# Patient Record
Sex: FEMALE
[De-identification: names, ages, dates, MRNs, and addresses within clinical notes are randomized per-mention and may not be internally consistent; named-entity substitution may affect disease eponyms.]

---

## 2017-03-28 ENCOUNTER — CHARTING TRANS (OUTPATIENT)
Dept: OTHER | Age: 42
End: 2017-03-28

## 2017-03-28 ENCOUNTER — LAB SERVICES (OUTPATIENT)
Dept: OTHER | Age: 42
End: 2017-03-28

## 2017-03-29 LAB
APPEARANCE: NORMAL
BILIRUBIN: NEGATIVE
COLOR: NORMAL
GLUCOSE U: NEGATIVE
KETONES: NEGATIVE
LEUKOCYTE ESTERASE: NORMAL
LEUKOCYTES: NORMAL
NITRITE: NEGATIVE
OCCULT BLOOD: NEGATIVE
OTHER: NORMAL
PH: 6
PROTEIN: NEGATIVE
URINE SPEC GRAVITY: 1.01
UROBILINOGEN: 0.2

## 2017-03-31 LAB — BACTERIA UR CULT: NORMAL

## 2018-11-05 VITALS
WEIGHT: 160 LBS | BODY MASS INDEX: 25.71 KG/M2 | TEMPERATURE: 98.5 F | RESPIRATION RATE: 14 BRPM | OXYGEN SATURATION: 98 % | DIASTOLIC BLOOD PRESSURE: 84 MMHG | HEART RATE: 78 BPM | SYSTOLIC BLOOD PRESSURE: 122 MMHG | HEIGHT: 66 IN

## 2020-06-24 ENCOUNTER — HOSPITAL ENCOUNTER (INPATIENT)
Facility: HOSPITAL | Age: 45
LOS: 2 days | Discharge: HOME | DRG: 440 | End: 2020-06-27
Attending: EMERGENCY MEDICINE | Admitting: INTERNAL MEDICINE
Payer: COMMERCIAL

## 2020-06-24 DIAGNOSIS — K85.90 ACUTE PANCREATITIS, UNSPECIFIED COMPLICATION STATUS, UNSPECIFIED PANCREATITIS TYPE: Primary | ICD-10-CM

## 2020-06-24 LAB
SARS-COV-2 RNA RESP QL NAA+PROBE: NOT DETECTED
TRIGL SERPL-MCNC: 149 MG/DL (ref 30–149)

## 2020-06-24 PROCEDURE — 96374 THER/PROPH/DIAG INJ IV PUSH: CPT

## 2020-06-24 PROCEDURE — 96361 HYDRATE IV INFUSION ADD-ON: CPT

## 2020-06-24 PROCEDURE — 84478 ASSAY OF TRIGLYCERIDES: CPT | Performed by: EMERGENCY MEDICINE

## 2020-06-24 PROCEDURE — 99285 EMERGENCY DEPT VISIT HI MDM: CPT

## 2020-06-24 PROCEDURE — 96375 TX/PRO/DX INJ NEW DRUG ADDON: CPT

## 2020-06-24 RX ORDER — ONDANSETRON 2 MG/ML
4 INJECTION INTRAMUSCULAR; INTRAVENOUS ONCE
Status: COMPLETED | OUTPATIENT
Start: 2020-06-24 | End: 2020-06-24

## 2020-06-24 RX ORDER — HYDROMORPHONE HYDROCHLORIDE 1 MG/ML
0.5 INJECTION, SOLUTION INTRAMUSCULAR; INTRAVENOUS; SUBCUTANEOUS ONCE
Status: COMPLETED | OUTPATIENT
Start: 2020-06-24 | End: 2020-06-24

## 2020-06-24 RX ORDER — SODIUM CHLORIDE 9 MG/ML
1000 INJECTION, SOLUTION INTRAVENOUS ONCE
Status: COMPLETED | OUTPATIENT
Start: 2020-06-24 | End: 2020-06-24

## 2020-06-25 ENCOUNTER — ANESTHESIA EVENT (OUTPATIENT)
Dept: ENDOSCOPY | Facility: HOSPITAL | Age: 45
DRG: 440 | End: 2020-06-25
Payer: COMMERCIAL

## 2020-06-25 ENCOUNTER — APPOINTMENT (OUTPATIENT)
Dept: MRI IMAGING | Facility: HOSPITAL | Age: 45
DRG: 440 | End: 2020-06-25
Attending: INTERNAL MEDICINE
Payer: COMMERCIAL

## 2020-06-25 ENCOUNTER — ANESTHESIA (OUTPATIENT)
Dept: ENDOSCOPY | Facility: HOSPITAL | Age: 45
DRG: 440 | End: 2020-06-25
Payer: COMMERCIAL

## 2020-06-25 PROBLEM — K85.90 ACUTE PANCREATITIS, UNSPECIFIED COMPLICATION STATUS, UNSPECIFIED PANCREATITIS TYPE: Status: ACTIVE | Noted: 2020-06-25

## 2020-06-25 LAB
ALBUMIN SERPL-MCNC: 2.7 G/DL (ref 3.4–5)
ALBUMIN/GLOB SERPL: 0.9 {RATIO} (ref 1–2)
ALP LIVER SERPL-CCNC: 77 U/L (ref 37–98)
ALT SERPL-CCNC: 24 U/L (ref 13–56)
ANION GAP SERPL CALC-SCNC: 3 MMOL/L (ref 0–18)
AST SERPL-CCNC: 11 U/L (ref 15–37)
BASOPHILS # BLD AUTO: 0.03 X10(3) UL (ref 0–0.2)
BASOPHILS NFR BLD AUTO: 0.3 %
BILIRUB SERPL-MCNC: 0.4 MG/DL (ref 0.1–2)
BUN BLD-MCNC: 13 MG/DL (ref 7–18)
BUN/CREAT SERPL: 19.4 (ref 10–20)
CALCIUM BLD-MCNC: 7.8 MG/DL (ref 8.5–10.1)
CHLORIDE SERPL-SCNC: 112 MMOL/L (ref 98–112)
CHOLEST SMN-MCNC: 108 MG/DL (ref ?–200)
CO2 SERPL-SCNC: 27 MMOL/L (ref 21–32)
CREAT BLD-MCNC: 0.67 MG/DL (ref 0.55–1.02)
DEPRECATED RDW RBC AUTO: 43.7 FL (ref 35.1–46.3)
EOSINOPHIL # BLD AUTO: 0.15 X10(3) UL (ref 0–0.7)
EOSINOPHIL NFR BLD AUTO: 1.4 %
ERYTHROCYTE [DISTWIDTH] IN BLOOD BY AUTOMATED COUNT: 12.9 % (ref 11–15)
GLOBULIN PLAS-MCNC: 2.9 G/DL (ref 2.8–4.4)
GLUCOSE BLD-MCNC: 87 MG/DL (ref 70–99)
HAV IGM SER QL: 2.1 MG/DL (ref 1.6–2.6)
HCT VFR BLD AUTO: 36.7 % (ref 35–48)
HDLC SERPL-MCNC: 36 MG/DL (ref 40–59)
HGB BLD-MCNC: 11.8 G/DL (ref 12–16)
IMM GRANULOCYTES # BLD AUTO: 0.03 X10(3) UL (ref 0–1)
IMM GRANULOCYTES NFR BLD: 0.3 %
LDLC SERPL CALC-MCNC: 41 MG/DL (ref ?–100)
LYMPHOCYTES # BLD AUTO: 3.89 X10(3) UL (ref 1–4)
LYMPHOCYTES NFR BLD AUTO: 36.9 %
M PROTEIN MFR SERPL ELPH: 5.6 G/DL (ref 6.4–8.2)
MCH RBC QN AUTO: 29.8 PG (ref 26–34)
MCHC RBC AUTO-ENTMCNC: 32.2 G/DL (ref 31–37)
MCV RBC AUTO: 92.7 FL (ref 80–100)
MONOCYTES # BLD AUTO: 0.77 X10(3) UL (ref 0.1–1)
MONOCYTES NFR BLD AUTO: 7.3 %
NEUTROPHILS # BLD AUTO: 5.68 X10 (3) UL (ref 1.5–7.7)
NEUTROPHILS # BLD AUTO: 5.68 X10(3) UL (ref 1.5–7.7)
NEUTROPHILS NFR BLD AUTO: 53.8 %
NONHDLC SERPL-MCNC: 72 MG/DL (ref ?–130)
OSMOLALITY SERPL CALC.SUM OF ELEC: 293 MOSM/KG (ref 275–295)
PLATELET # BLD AUTO: 256 10(3)UL (ref 150–450)
POTASSIUM SERPL-SCNC: 3.9 MMOL/L (ref 3.5–5.1)
RBC # BLD AUTO: 3.96 X10(6)UL (ref 3.8–5.3)
SODIUM SERPL-SCNC: 142 MMOL/L (ref 136–145)
TRIGL SERPL-MCNC: 154 MG/DL (ref 30–149)
VLDLC SERPL CALC-MCNC: 31 MG/DL (ref 0–30)
WBC # BLD AUTO: 10.6 X10(3) UL (ref 4–11)

## 2020-06-25 PROCEDURE — 74183 MRI ABD W/O CNTR FLWD CNTR: CPT | Performed by: INTERNAL MEDICINE

## 2020-06-25 PROCEDURE — 76376 3D RENDER W/INTRP POSTPROCES: CPT | Performed by: INTERNAL MEDICINE

## 2020-06-25 PROCEDURE — 80061 LIPID PANEL: CPT | Performed by: INTERNAL MEDICINE

## 2020-06-25 PROCEDURE — 85025 COMPLETE CBC W/AUTO DIFF WBC: CPT | Performed by: INTERNAL MEDICINE

## 2020-06-25 PROCEDURE — 88305 TISSUE EXAM BY PATHOLOGIST: CPT | Performed by: INTERNAL MEDICINE

## 2020-06-25 PROCEDURE — A9575 INJ GADOTERATE MEGLUMI 0.1ML: HCPCS | Performed by: HOSPITALIST

## 2020-06-25 PROCEDURE — 80053 COMPREHEN METABOLIC PANEL: CPT | Performed by: INTERNAL MEDICINE

## 2020-06-25 PROCEDURE — 83735 ASSAY OF MAGNESIUM: CPT | Performed by: INTERNAL MEDICINE

## 2020-06-25 PROCEDURE — 0DB38ZX EXCISION OF LOWER ESOPHAGUS, VIA NATURAL OR ARTIFICIAL OPENING ENDOSCOPIC, DIAGNOSTIC: ICD-10-PCS | Performed by: INTERNAL MEDICINE

## 2020-06-25 RX ORDER — ONDANSETRON 2 MG/ML
4 INJECTION INTRAMUSCULAR; INTRAVENOUS EVERY 4 HOURS PRN
Status: DISCONTINUED | OUTPATIENT
Start: 2020-06-25 | End: 2020-06-27

## 2020-06-25 RX ORDER — LIDOCAINE HYDROCHLORIDE 10 MG/ML
INJECTION, SOLUTION EPIDURAL; INFILTRATION; INTRACAUDAL; PERINEURAL AS NEEDED
Status: DISCONTINUED | OUTPATIENT
Start: 2020-06-25 | End: 2020-06-25 | Stop reason: SURG

## 2020-06-25 RX ORDER — SODIUM CHLORIDE, SODIUM LACTATE, POTASSIUM CHLORIDE, CALCIUM CHLORIDE 600; 310; 30; 20 MG/100ML; MG/100ML; MG/100ML; MG/100ML
INJECTION, SOLUTION INTRAVENOUS CONTINUOUS
Status: DISCONTINUED | OUTPATIENT
Start: 2020-06-25 | End: 2020-06-27

## 2020-06-25 RX ORDER — HYDROMORPHONE HYDROCHLORIDE 1 MG/ML
0.5 INJECTION, SOLUTION INTRAMUSCULAR; INTRAVENOUS; SUBCUTANEOUS EVERY 30 MIN PRN
Status: ACTIVE | OUTPATIENT
Start: 2020-06-25 | End: 2020-06-25

## 2020-06-25 RX ORDER — ENOXAPARIN SODIUM 100 MG/ML
40 INJECTION SUBCUTANEOUS DAILY
Status: DISCONTINUED | OUTPATIENT
Start: 2020-06-25 | End: 2020-06-27

## 2020-06-25 RX ORDER — ONDANSETRON 2 MG/ML
4 INJECTION INTRAMUSCULAR; INTRAVENOUS EVERY 6 HOURS PRN
Status: DISCONTINUED | OUTPATIENT
Start: 2020-06-25 | End: 2020-06-27

## 2020-06-25 RX ORDER — SODIUM CHLORIDE 9 MG/ML
INJECTION, SOLUTION INTRAVENOUS CONTINUOUS
Status: ACTIVE | OUTPATIENT
Start: 2020-06-25 | End: 2020-06-25

## 2020-06-25 RX ORDER — NALOXONE HYDROCHLORIDE 0.4 MG/ML
80 INJECTION, SOLUTION INTRAMUSCULAR; INTRAVENOUS; SUBCUTANEOUS AS NEEDED
Status: ACTIVE | OUTPATIENT
Start: 2020-06-25 | End: 2020-06-26

## 2020-06-25 RX ORDER — ACETAMINOPHEN 325 MG/1
650 TABLET ORAL EVERY 6 HOURS PRN
Status: DISCONTINUED | OUTPATIENT
Start: 2020-06-25 | End: 2020-06-27

## 2020-06-25 RX ORDER — MORPHINE SULFATE 4 MG/ML
1 INJECTION, SOLUTION INTRAMUSCULAR; INTRAVENOUS EVERY 2 HOUR PRN
Status: DISCONTINUED | OUTPATIENT
Start: 2020-06-25 | End: 2020-06-27

## 2020-06-25 RX ORDER — MORPHINE SULFATE 4 MG/ML
2 INJECTION, SOLUTION INTRAMUSCULAR; INTRAVENOUS EVERY 2 HOUR PRN
Status: DISCONTINUED | OUTPATIENT
Start: 2020-06-25 | End: 2020-06-27

## 2020-06-25 RX ORDER — METOCLOPRAMIDE HYDROCHLORIDE 5 MG/ML
10 INJECTION INTRAMUSCULAR; INTRAVENOUS EVERY 8 HOURS PRN
Status: DISCONTINUED | OUTPATIENT
Start: 2020-06-25 | End: 2020-06-27

## 2020-06-25 RX ORDER — MORPHINE SULFATE 4 MG/ML
4 INJECTION, SOLUTION INTRAMUSCULAR; INTRAVENOUS EVERY 2 HOUR PRN
Status: DISCONTINUED | OUTPATIENT
Start: 2020-06-25 | End: 2020-06-27

## 2020-06-25 RX ADMIN — LIDOCAINE HYDROCHLORIDE 100 MG: 10 INJECTION, SOLUTION EPIDURAL; INFILTRATION; INTRACAUDAL; PERINEURAL at 15:50:00

## 2020-06-25 NOTE — ED PROVIDER NOTES
Patient Seen in: [facility] Emergency Department      History   Patient presents with:  Abdomen/Flank Pain    Stated Complaint: FROM IC, PANCREATITIS     HPI    53-year-old female presents to the emergency department for complaints of abdominal pain nontender. Extremities: No evidence of deformity. No clubbing or cyanosis. Neuro: No focal deficit is noted. ED Course     Labs Reviewed   TRIGLYCERIDES   RAPID SARS-COV-2 BY PCR          Patient was brought back to the examination room immediately.

## 2020-06-25 NOTE — PROGRESS NOTES
Received pt from endoscopy s/p EGD with biopsies. Alert, oriented. Calm. Denies pain, n/v. NSR. Saturating well on RA.  Pt kept NPO for MRI

## 2020-06-25 NOTE — H&P
[facility] Hospitalist H&P       CC: b/l flank pain    PCP: Kamron Evans MD    History of Present Illness: Pt is a 46 yo with no significant PMH who is admitted for b/l flank pain and noted to have pancreatitis on imaging.  Yesterday at work, b/l severe co Extremities: Extremities normal, atraumatic, no cyanosis or edema.    Skin: Skin color, texture, turgor normal. No visible rashes    Neurologic:  Psychiatric: No facial droop or dysarthria  appropriate affect,  memory intact     Diagnostic Data:    CBC/Ch

## 2020-06-25 NOTE — ED NOTES
Patient is c/o increased pain MD notified and new orders received.
Patient is resting quietly on ER cart and appears comfortable. Awaiting admit.
Patient states pain is resolved and appears comfortable.
Report called to [facility]
7

## 2020-06-25 NOTE — OPERATIVE REPORT
ENDOSCOPY OPERATIVE REPORT    Patient Name:  Kevin Freeman Record #: [id number]  YOB: 1975  Date of Procedure: 6/25/2020    Preoperative Diagnosis:  Pancreatitis, abdominal pain    Postoperative Diagnosis:  There was a small schatzki and throughtout the vital signs were stable. Specimens:  See above      Condition on discharge from procedure:  good      PLAN:    I will communicate results of the pathology with the patient. Pain has improved since this morning.  Continue IVF and

## 2020-06-25 NOTE — CONSULTS
[facility]  Report of Consultation    Kelley Figueredo Patient Status:  Inpatient    1975 MRN [id number]   [facility] 8NE-A Attending Loraine Tamez, *   Hosp Day # 0 PCP Leslee Sen MD     Reason for Consultation:  Tino Nicholson the procedure are available and option of no further investigation/procedure and the risks and benefits of these alternatives were discussed with the patient. The patient demonstrated understanding.     Dr Jasmina Hall and I discussed her care today as well changes  RESPIRATORY: denies new/changing shortness of breath, wheezing or cough   CARDIOVASCULAR: denies chest pain or WHITE; no palpitations   GI: as above  GENITAL//GYN: no dysuria, urgency or frequency;  no nocturia  MUSCULOSKELETAL: no new joint compl 5.10 mmol/L 3.70   Chloride      101 - 111 mmol/L 103   Carbon Dioxide, Total      22.0 - 32.0 mmol/L 26.6   CALCIUM      8.3 - 10.3 mg/dL 9.3   TOTAL PROTEIN      6.1 - 8.3 g/dL 7.2   Albumin      3.5 - 4.8 g/dL 3.8   Total Bilirubin      0.10 - 2.00 mg/d lymphadenopathy. GASTROINTESTINAL TRACT:  Normal appendix. PERITONEUM:Normal.  PELVIC ORGANS:  IUD in place. BLADDER:Normal.  ABDOMINAL WALL:  Fat-containing umbilical hernia.    MUSCULOSKELETAL:  Normal.  =====  IMPRESSION:   Peripancreatic edema juan

## 2020-06-25 NOTE — PLAN OF CARE
NURSING ADMISSION NOTE      Patient admitted via Cart  Oriented to room. Safety precautions initiated. Bed in low position. Call light in reach. Pt admitted to CTU 8. A&O x4. Tele rhythm NSR. O2 sat WNL on room air.  Pt denies c/o chest pain or sh toileting routine/schedule  - Consider collaborating with pharmacy to review patient's medication profile  Outcome: Progressing  Goal: Maintains adequate nutritional intake (undernourished)  Description  INTERVENTIONS:  - Monitor percentage of each meal co

## 2020-06-25 NOTE — ANESTHESIA PREPROCEDURE EVALUATION
PRE-OP EVALUATION    Patient Name: Alanis Tan    Pre-op Diagnosis: ABDOMINAL PAIN    Procedure(s):  ESOPHAGOGASTRODUODENOSCOPY with biopsies     Surgeon(s) and Role:     * Austin Casas MD - Primary    Pre-op vitals reviewed.   Temp: 99.2 °F (37.3 ° Endo/Other                                  Pulmonary                           Neuro/Psych                                    History reviewed. No pertinent surgical history.   Social History    Tobacco Use      Smoking status: Never Smoker      Smokeless

## 2020-06-25 NOTE — ANESTHESIA POSTPROCEDURE EVALUATION
[address] [facility] Patient Status:  Inpatient   Age/Gender 45year old female MRN [id number]   Location [address] [facility]. Attending [address] Day # 0 PCP Eloina Naqvi MD       Anesthesia Post-op Note    Procedur

## 2020-06-26 LAB — HGB BLD-MCNC: 12.2 G/DL (ref 12–16)

## 2020-06-26 PROCEDURE — 85018 HEMOGLOBIN: CPT | Performed by: HOSPITALIST

## 2020-06-26 RX ORDER — HYDROCODONE BITARTRATE AND ACETAMINOPHEN 5; 325 MG/1; MG/1
1 TABLET ORAL EVERY 6 HOURS PRN
Status: DISCONTINUED | OUTPATIENT
Start: 2020-06-26 | End: 2020-06-27

## 2020-06-26 NOTE — PLAN OF CARE
Assumed care for pt at 0700  Aox4, calm, cooperative, independent, up ad maki. NSR on cardiac monitor. Adequate saturation on RA. Lung sounds clear. Denies SOB, chest discomfort. Had some nausea this morning-zofran given and seems to be better. Denies pain. adequate nutritional intake (undernourished)  Description  INTERVENTIONS:  - Monitor percentage of each meal consumed  - Identify factors contributing to decreased intake, treat as appropriate  - Assist with meals as needed  - Monitor I&O, WT and lab value

## 2020-06-26 NOTE — PLAN OF CARE
Alert and oriented  SR on tele  Up ad maki  IVF infusing  C/o abdominal pain -medicated as ordered  MRI of the abdomen completed  Pt able to tolerate the chicken broth  Plan of care discussed.       Problem: Patient/Family Goals  Goal: Patient/Family Long Te values  - Obtain nutritional consult as needed  - Optimize oral hygiene and moisture  - Encourage food from home; allow for food preferences  - Enhance eating environment  Outcome: Progressing  Goal: Achieves appropriate nutritional intake (bariatric)  Huey

## 2020-06-26 NOTE — PROGRESS NOTES
[facility] Hospitalist Progress Note     PCP: Mandy Pablo MD    CC:  Follow up    SUBJECTIVE:  Sitting up in bed, tolerated CLD. Pain nearly resolved. No n/v.  Hopes to go home today    OBJECTIVE:  Temp:  [98.9 °F (37.2 °C)-99.4 °F (37.4 °C)] 98.9 °F (37.2 ° [facility] who is admitted for b/l flank pain and noted to have pancreatitis on imaging     **acute pancreatitis-improving  -CT a/p suggesting acute interstitial pancreatitis  -lipase significantly elevated.  UA neg, UC pending, TG not markedly elevated  - IVF, IV

## 2020-06-26 NOTE — PROGRESS NOTES
[facility]  Progress Note    Enio Wolf Patient Status:  Inpatient    1975 MRN [id number]   [facility] 8NE-A Attending Erika Dodd, *   Hosp Day # 1 PCP Mare Vyas MD     Subjective:  Enio Wolf is a(n) 44 year 12.9  --    NEPRELIM  --   --  5.68  --    WBC 11.99  --  10.6  --      --  256.0  --     < > = values in this interval not displayed. PROCEDURE:  MRI ABDOMEN&MRCP W/3D (W+W0)(CPT=74183/09991)     COMPARISON:  None.      INDICATIONS:  pa choledocholithiasis. 2. No suspicious hepatic lesions. 3. Patchy left lower lobe consolidation. Minimal right lower lobe consolidation. 4. No suspicious pancreatic lesions. Minimal fluid adjacent to the pancreatic tail is noted.   This may be

## 2020-06-26 NOTE — PAYOR COMM NOTE
--------------  ADMISSION REVIEW     Payor: [address] [facility]  Subscriber #:  [id number]  Authorization Number: [id number]      Patient Seen in: [facility] Emergency Department    History   Patient presents with:  Abdomen/Flank Pain    Stated Co 10:19 PM    Disposition and Plan     Clinical Impression:  Acute pancreatitis, unspecified complication status, unspecified pancreatitis type  (primary encounter diagnosis)    Disposition:  Admit  6/24/2020 10:19 pm               [facility] Hospitalist H&P 14.0 13   CREATSERUM 0.85 0.67   GLU 95 87   CA  --  7.8*   MG  --  2.1     Lab 06/24/20 1959 06/25/20  0449   ALT 27 24   AST 19 11*   ALB 3.8 2.7*      Radiology: Ct Abdomen+pelvis(contrast Only)(cpt=74177)    Result Date: 6/24/2020  IMPRESSION: Milagros Guerra unspecified complication status, unspecified pancreatitis type     44 yo with no significant PMH who is admitted for b/l flank pain and noted to have pancreatitis on imaging     **acute pancreatitis-improving  -CT a/p suggesting acute interstitial pancreat

## 2020-06-27 VITALS
DIASTOLIC BLOOD PRESSURE: 69 MMHG | OXYGEN SATURATION: 98 % | WEIGHT: 173.06 LBS | RESPIRATION RATE: 18 BRPM | HEART RATE: 70 BPM | BODY MASS INDEX: 28 KG/M2 | TEMPERATURE: 98 F | SYSTOLIC BLOOD PRESSURE: 104 MMHG

## 2020-06-27 LAB
ALBUMIN SERPL-MCNC: 2.8 G/DL (ref 3.4–5)
ALBUMIN/GLOB SERPL: 0.8 {RATIO} (ref 1–2)
ALP LIVER SERPL-CCNC: 76 U/L (ref 37–98)
ALT SERPL-CCNC: 21 U/L (ref 13–56)
ANION GAP SERPL CALC-SCNC: 5 MMOL/L (ref 0–18)
AST SERPL-CCNC: 11 U/L (ref 15–37)
BASOPHILS # BLD AUTO: 0.03 X10(3) UL (ref 0–0.2)
BASOPHILS NFR BLD AUTO: 0.4 %
BILIRUB SERPL-MCNC: 0.2 MG/DL (ref 0.1–2)
BILIRUB UR QL STRIP.AUTO: NEGATIVE
BUN BLD-MCNC: 6 MG/DL (ref 7–18)
BUN/CREAT SERPL: 8.5 (ref 10–20)
CALCIUM BLD-MCNC: 8.4 MG/DL (ref 8.5–10.1)
CHLORIDE SERPL-SCNC: 108 MMOL/L (ref 98–112)
CLARITY UR REFRACT.AUTO: CLEAR
CO2 SERPL-SCNC: 27 MMOL/L (ref 21–32)
CREAT BLD-MCNC: 0.71 MG/DL (ref 0.55–1.02)
DEPRECATED RDW RBC AUTO: 43.3 FL (ref 35.1–46.3)
EOSINOPHIL # BLD AUTO: 0.36 X10(3) UL (ref 0–0.7)
EOSINOPHIL NFR BLD AUTO: 4.6 %
ERYTHROCYTE [DISTWIDTH] IN BLOOD BY AUTOMATED COUNT: 12.9 % (ref 11–15)
GLOBULIN PLAS-MCNC: 3.6 G/DL (ref 2.8–4.4)
GLUCOSE BLD-MCNC: 102 MG/DL (ref 70–99)
GLUCOSE UR STRIP.AUTO-MCNC: NEGATIVE MG/DL
HCT VFR BLD AUTO: 38.5 % (ref 35–48)
HGB BLD-MCNC: 12.4 G/DL (ref 12–16)
IMM GRANULOCYTES # BLD AUTO: 0.02 X10(3) UL (ref 0–1)
IMM GRANULOCYTES NFR BLD: 0.3 %
KETONES UR STRIP.AUTO-MCNC: NEGATIVE MG/DL
LEUKOCYTE ESTERASE UR QL STRIP.AUTO: NEGATIVE
LIPASE SERPL-CCNC: 545 U/L (ref 73–393)
LYMPHOCYTES # BLD AUTO: 2.73 X10(3) UL (ref 1–4)
LYMPHOCYTES NFR BLD AUTO: 34.6 %
M PROTEIN MFR SERPL ELPH: 6.4 G/DL (ref 6.4–8.2)
MCH RBC QN AUTO: 29.7 PG (ref 26–34)
MCHC RBC AUTO-ENTMCNC: 32.2 G/DL (ref 31–37)
MCV RBC AUTO: 92.1 FL (ref 80–100)
MONOCYTES # BLD AUTO: 0.52 X10(3) UL (ref 0.1–1)
MONOCYTES NFR BLD AUTO: 6.6 %
NEUTROPHILS # BLD AUTO: 4.22 X10 (3) UL (ref 1.5–7.7)
NEUTROPHILS # BLD AUTO: 4.22 X10(3) UL (ref 1.5–7.7)
NEUTROPHILS NFR BLD AUTO: 53.5 %
NITRITE UR QL STRIP.AUTO: NEGATIVE
OSMOLALITY SERPL CALC.SUM OF ELEC: 288 MOSM/KG (ref 275–295)
PH UR STRIP.AUTO: 7 [PH] (ref 4.5–8)
PLATELET # BLD AUTO: 282 10(3)UL (ref 150–450)
POTASSIUM SERPL-SCNC: 3.7 MMOL/L (ref 3.5–5.1)
PROCALCITONIN SERPL-MCNC: <0.05 NG/ML (ref ?–0.16)
PROT UR STRIP.AUTO-MCNC: NEGATIVE MG/DL
RBC # BLD AUTO: 4.18 X10(6)UL (ref 3.8–5.3)
SODIUM SERPL-SCNC: 140 MMOL/L (ref 136–145)
SP GR UR STRIP.AUTO: <1.005 (ref 1–1.03)
UROBILINOGEN UR STRIP.AUTO-MCNC: <2 MG/DL
WBC # BLD AUTO: 7.9 X10(3) UL (ref 4–11)

## 2020-06-27 PROCEDURE — 81001 URINALYSIS AUTO W/SCOPE: CPT | Performed by: HOSPITALIST

## 2020-06-27 PROCEDURE — 80053 COMPREHEN METABOLIC PANEL: CPT | Performed by: INTERNAL MEDICINE

## 2020-06-27 PROCEDURE — S0028 INJECTION, FAMOTIDINE, 20 MG: HCPCS | Performed by: INTERNAL MEDICINE

## 2020-06-27 PROCEDURE — 85025 COMPLETE CBC W/AUTO DIFF WBC: CPT | Performed by: INTERNAL MEDICINE

## 2020-06-27 PROCEDURE — 83690 ASSAY OF LIPASE: CPT | Performed by: HOSPITALIST

## 2020-06-27 PROCEDURE — 84145 PROCALCITONIN (PCT): CPT | Performed by: HOSPITALIST

## 2020-06-27 RX ORDER — POTASSIUM CHLORIDE 20 MEQ/1
40 TABLET, EXTENDED RELEASE ORAL ONCE
Status: COMPLETED | OUTPATIENT
Start: 2020-06-27 | End: 2020-06-27

## 2020-06-27 RX ORDER — FAMOTIDINE 10 MG/ML
20 INJECTION, SOLUTION INTRAVENOUS DAILY
Status: DISCONTINUED | OUTPATIENT
Start: 2020-06-27 | End: 2020-06-27

## 2020-06-27 RX ORDER — FAMOTIDINE 20 MG/1
20 TABLET ORAL DAILY
Qty: 30 TABLET | Refills: 0 | Status: SHIPPED | OUTPATIENT
Start: 2020-06-27

## 2020-06-27 NOTE — PLAN OF CARE
Assumed care of patient @ 0730, A/XO4, here for abdominal pain, MRI showing acute pancreatitis.  Pt was complaining of 5/10 pain @ 0700 however tylenol w/ norco following has improved pt's pain level - 1/10 tolerable, encouraged to sit up and ambulate if ab

## 2020-06-27 NOTE — PROGRESS NOTES
[facility]  Progress Note    Terry Stage Patient Status:  Inpatient    1975 MRN [id number]   [facility] 8NE-A Attending Du Lofton, *   Hosp Day # 2 PCP Brunilda Sepulveda MD     Subjective:  Terry Stage is a(n) 44 year and cooperative  Lungs: clear to auscultation bilaterally  Heart: reg rate  Abdomen: soft. Non tender/distended. No evangelina/guarding/rigidity.   No pain w/ deep palpation  Extremities: extremities normal, atraumatic, no cyanosis or edema  Neurologic: Grossly no KIDNEYS:  No mass or obstruction. ADRENALS:  No mass or enlargement. AORTA/VASCULAR:  No aneurysm or dissection. RETROPERITONEUM:  No mass or adenopathy. BOWEL/MESENTERY:  No visible mass, obstruction, or bowel wall thickening.     ABDOMINAL W their questions and concerns were addressed and were agreeable to the plan as listed      Kelly Jean

## 2020-06-27 NOTE — PLAN OF CARE
Assumed care of pt at 1930 a/o x4 in no apparent distress tolerating a full liquid dinner. She stated some loose BM's during the day and has not had food substance in days. She is getting IVF of LR and IV site c/d/I without redness or swelling noted.   Sh

## 2020-06-27 NOTE — PROGRESS NOTES
Here are the biopsy/pathology findings from your recent EGD (uppper endoscopy):     The biopsy/pathology findings from your upper endoscopy showed:    --biopsies from the lower esophagus did not have cancer noted, it did show the inflammatory cells called \

## 2020-06-27 NOTE — PROGRESS NOTES
[facility] hospitalist daily note  Patient was seen/examined on 6/27/20    S; no chest pain, no SOB, does have abdominal pain after eating  + passing gas and urinating    Medications in Epic    PE    06/27/20  0810   BP: 133/88   Pulse: 77   Resp: 18   Temp: 98. 3

## 2020-06-29 NOTE — PAYOR COMM NOTE
[facility]  Subscriber #:  [id number]  Authorization Number: [id number]      Admit date: 6/25/20  Admit time:  0000  Discharge Date: 6/27/2020

## 2020-07-02 NOTE — DISCHARGE SUMMARY
[facility] Internal Medicine Discharge Summary   Patient ID:  Sangita Colunga  [id number]  40 year old  7/31/1975    Admit date: 6/24/2020    Discharge date and time: 6/27/20/2020     Attending Physician: No att. providers found     Primary Care Physician: Maria Del Rosario Cox including morphine IV ordered   Check lipase, check UA   Recent Urine pregnancy negative     Consolidation on MRCP  Check procalcitonin  No URI symptoms, pt is afebrile, no leukocytosis  Recent Rapid covid negative  Instructed pt to follow up with PCP, pt PM     Finalized by: Danica Ho MD on 6/25/2020 at 11:27 PM             Operative Procedures: Procedure(s) (LRB):  ESOPHAGOGASTRODUODENOSCOPY (EGD) (N/A)       Please seek medical attention if your symptoms do no improve or get worse    Diet as per

## 2020-07-24 ENCOUNTER — LAB ENCOUNTER (OUTPATIENT)
Dept: LAB | Facility: HOSPITAL | Age: 45
End: 2020-07-24
Attending: INTERNAL MEDICINE
Payer: COMMERCIAL

## 2020-07-24 DIAGNOSIS — K85.90 ACUTE PANCREATITIS, UNSPECIFIED COMPLICATION STATUS, UNSPECIFIED PANCREATITIS TYPE: ICD-10-CM

## 2020-07-25 LAB — SARS-COV-2 RNA RESP QL NAA+PROBE: NOT DETECTED

## 2020-07-27 ENCOUNTER — HOSPITAL ENCOUNTER (OUTPATIENT)
Facility: HOSPITAL | Age: 45
Setting detail: HOSPITAL OUTPATIENT SURGERY
Discharge: HOME | End: 2020-07-27
Attending: INTERNAL MEDICINE | Admitting: INTERNAL MEDICINE
Payer: COMMERCIAL

## 2020-07-27 ENCOUNTER — ANESTHESIA EVENT (OUTPATIENT)
Dept: ENDOSCOPY | Facility: HOSPITAL | Age: 45
End: 2020-07-27
Payer: COMMERCIAL

## 2020-07-27 ENCOUNTER — ANESTHESIA (OUTPATIENT)
Dept: ENDOSCOPY | Facility: HOSPITAL | Age: 45
End: 2020-07-27
Payer: COMMERCIAL

## 2020-07-27 VITALS
OXYGEN SATURATION: 100 % | BODY MASS INDEX: 25.71 KG/M2 | TEMPERATURE: 99 F | RESPIRATION RATE: 20 BRPM | SYSTOLIC BLOOD PRESSURE: 112 MMHG | HEIGHT: 66 IN | HEART RATE: 69 BPM | WEIGHT: 160 LBS | DIASTOLIC BLOOD PRESSURE: 65 MMHG

## 2020-07-27 DIAGNOSIS — K85.90 ACUTE PANCREATITIS, UNSPECIFIED COMPLICATION STATUS, UNSPECIFIED PANCREATITIS TYPE: Primary | ICD-10-CM

## 2020-07-27 DIAGNOSIS — Z87.19 HISTORY OF PANCREATITIS: ICD-10-CM

## 2020-07-27 LAB
POCT URINE PREGNANCY: NEGATIVE
PROCEDURE CONTROL: PRESENT

## 2020-07-27 PROCEDURE — 81025 URINE PREGNANCY TEST: CPT | Performed by: INTERNAL MEDICINE

## 2020-07-27 PROCEDURE — 0DJ08ZZ INSPECTION OF UPPER INTESTINAL TRACT, VIA NATURAL OR ARTIFICIAL OPENING ENDOSCOPIC: ICD-10-PCS | Performed by: INTERNAL MEDICINE

## 2020-07-27 RX ORDER — HYDROMORPHONE HYDROCHLORIDE 1 MG/ML
0.4 INJECTION, SOLUTION INTRAMUSCULAR; INTRAVENOUS; SUBCUTANEOUS EVERY 5 MIN PRN
Status: DISCONTINUED | OUTPATIENT
Start: 2020-07-27 | End: 2020-07-27

## 2020-07-27 RX ORDER — SODIUM CHLORIDE, SODIUM LACTATE, POTASSIUM CHLORIDE, CALCIUM CHLORIDE 600; 310; 30; 20 MG/100ML; MG/100ML; MG/100ML; MG/100ML
INJECTION, SOLUTION INTRAVENOUS CONTINUOUS
Status: DISCONTINUED | OUTPATIENT
Start: 2020-07-27 | End: 2020-07-27

## 2020-07-27 RX ORDER — NALOXONE HYDROCHLORIDE 0.4 MG/ML
80 INJECTION, SOLUTION INTRAMUSCULAR; INTRAVENOUS; SUBCUTANEOUS AS NEEDED
Status: DISCONTINUED | OUTPATIENT
Start: 2020-07-27 | End: 2020-07-27

## 2020-07-27 RX ORDER — METOCLOPRAMIDE HYDROCHLORIDE 5 MG/ML
10 INJECTION INTRAMUSCULAR; INTRAVENOUS AS NEEDED
Status: DISCONTINUED | OUTPATIENT
Start: 2020-07-27 | End: 2020-07-27

## 2020-07-27 RX ORDER — LIDOCAINE HYDROCHLORIDE 10 MG/ML
INJECTION, SOLUTION EPIDURAL; INFILTRATION; INTRACAUDAL; PERINEURAL AS NEEDED
Status: DISCONTINUED | OUTPATIENT
Start: 2020-07-27 | End: 2020-07-27 | Stop reason: SURG

## 2020-07-27 RX ORDER — HYDROCODONE BITARTRATE AND ACETAMINOPHEN 10; 325 MG/1; MG/1
1 TABLET ORAL AS NEEDED
Status: DISCONTINUED | OUTPATIENT
Start: 2020-07-27 | End: 2020-07-27

## 2020-07-27 RX ORDER — HYDROCODONE BITARTRATE AND ACETAMINOPHEN 10; 325 MG/1; MG/1
2 TABLET ORAL AS NEEDED
Status: DISCONTINUED | OUTPATIENT
Start: 2020-07-27 | End: 2020-07-27

## 2020-07-27 RX ORDER — ONDANSETRON 2 MG/ML
4 INJECTION INTRAMUSCULAR; INTRAVENOUS AS NEEDED
Status: DISCONTINUED | OUTPATIENT
Start: 2020-07-27 | End: 2020-07-27

## 2020-07-27 RX ADMIN — SODIUM CHLORIDE, SODIUM LACTATE, POTASSIUM CHLORIDE, CALCIUM CHLORIDE: 600; 310; 30; 20 INJECTION, SOLUTION INTRAVENOUS at 15:51:00

## 2020-07-27 RX ADMIN — LIDOCAINE HYDROCHLORIDE 25 MG: 10 INJECTION, SOLUTION EPIDURAL; INFILTRATION; INTRACAUDAL; PERINEURAL at 15:32:00

## 2020-07-27 RX ADMIN — SODIUM CHLORIDE, SODIUM LACTATE, POTASSIUM CHLORIDE, CALCIUM CHLORIDE: 600; 310; 30; 20 INJECTION, SOLUTION INTRAVENOUS at 15:32:00

## 2020-07-27 NOTE — OPERATIVE REPORT
OPERATIVE REPORT   PATIENT NAME: Haylee Whitehead  MRN: [id number]  DATE OF OPERATION: 7/27/2020  PREOPERATIVE DIAGNOSIS:   1. Acute pancreatitis, of unclear etiology. POSTOPERATIVE DIAGNOSES:  1.   Tiny gallbladder polyp  PROCEDURE PERFORMED: Upper endoscopic

## 2020-07-27 NOTE — ANESTHESIA PREPROCEDURE EVALUATION
PRE-OP EVALUATION    Patient Name: Yessica Carrasco    Pre-op Diagnosis: History of pancreatitis [Z87.19]    Procedure(s):  UPPER ENDOSCOPIC ULTRASOUND    Surgeon(s) and Role:     Eduin Baeza MD - Primary    Pre-op vitals reviewed.         Body mass ind 06/27/2020     06/24/2020    K 3.7 06/27/2020    K 3.70 06/24/2020     06/27/2020     06/24/2020    CO2 27.0 06/27/2020    CO2 26.6 06/24/2020    BUN 6 (L) 06/27/2020    BUN 14.0 06/24/2020    CREATSERUM 0.71 06/27/2020    CREATSERUM 0.85

## 2020-07-27 NOTE — ANESTHESIA POSTPROCEDURE EVALUATION
[address] Patient Status:  Hospital Outpatient Surgery   Age/Gender 45year old female MRN [id number]   Location [address] [facility]. Attending Marina Marshall MD   Hosp Day # 0 PCP Brunilda Sepulveda MD       Anesthesia Post-op Note

## (undated) DEVICE — 1200CC GUARDIAN II: Brand: GUARDIAN

## (undated) DEVICE — 3M™ RED DOT™ MONITORING ELECTRODE WITH FOAM TAPE AND STICKY GEL, 50/BAG, 20/CASE, 72/PLT 2570: Brand: RED DOT™

## (undated) DEVICE — ENDOSCOPY PACK UPPER: Brand: MEDLINE INDUSTRIES, INC.

## (undated) DEVICE — ENDOSCOPY PACK - LOWER: Brand: MEDLINE INDUSTRIES, INC.

## (undated) DEVICE — FORCEP BIOPSY RJ4 LG CAP W/ND

## (undated) DEVICE — Device: Brand: DEFENDO AIR/WATER/SUCTION AND BIOPSY VALVE

## (undated) DEVICE — FILTERLINE NASAL ADULT O2/CO2

## (undated) NOTE — LETTER
6/29/2020          [address] [place] [id number]    Dear Jennifer Shen,     Here are the biopsy/pathology findings from your recent EGD (uppper endoscopy):     The biopsy/pathology findings from your upper endoscopy showed:    --biopsies from t

## (undated) NOTE — LETTER
Pari Stevens [address]  [address] [facility] [address], [address] [facility]  Authorization for Surgical Operation and Procedure     Date:__6/25/2020_________                                                                                                         Time:__0 potential risks that can occur: fever and allergic reactions, hemolytic reactions, transmission of diseases such as Hepatitis, AIDS and Cytomegalovirus (CMV) and fluid overload.   In the event that I wish to have an autologous transfusion of my own blood, o attending physician will determine when the applicable recovery period ends for purposes of reinstating the DNAR order.   10. Patients having a sterilization procedure: I understand that if the procedure is successful the results will be permanent and it wi on my reliance that the physicians providing care and treatment to me are employees or agents of the Hospital.  2. As the patient asking for anesthesia services, I agree to:  a.  Allow the anesthesiologist/ CRNA to give me medicine and do additional procedu include itching, low blood pressure, difficulty urinating, headache or slowing of baby’s heart. Vary rare risks include infection, bleeding, seizure, irregular heart rhythms and nerve injury.    7. Regional Anesthesia (“spinal” “epidural” & “nerve blocks”)